# Patient Record
(demographics unavailable — no encounter records)

---

## 2024-11-03 NOTE — HISTORY OF PRESENT ILLNESS
[Pain is well-controlled] : pain is well-controlled [Clean/Dry/Intact] : clean, dry and intact [None] : no vaginal bleeding [Normal] : normal [Pathology reviewed] : pathology reviewed [Fever] : no fever [Chills] : no chills [Nausea] : no nausea [Vomiting] : no vomiting [Erythema] : not erythematous [de-identified] :  Pelvic exam under anesthesia, total laparoscopic hysterectomy, bilateral salpingectomy, and cystoscopy.   [de-identified] : 10/17/2024 [de-identified] :  38 yo female presents for post-op visit, s/p pelvic exam under anesthesia, total laparoscopic hysterectomy, bilateral salpingectomy, and cystoscopy on 10/17/2024. She was discharged on POD 0. She is doing well and has no complaints.   Pathology: 1. Fallopian tube, right, excision  - Fallopian tube with no significant histopathological change  2. Fallopian tube, left, excision - Fallopian tube with no significant histopathological change  3. Uterus and cervix, total hysterectomy - Secretory endometrium - Leiomyomata - Cervix with metaplastic change  Operative Findings:  EUA: Anteverted mobile 12 cm uterus.  No palpable adnexal masses.  LSC: Atraumatic entry sites.  Uterus with 8 cm right fundal intramural fibroid.  Normal-appearing bilateral fallopian tubes and ovaries.  Unremarkable  abdominal survey including liver edge, left hemidiaphragm, bowel and bladder.  Good hemostasis noted.  Cystoscopy: Bilateral ureter or orifices with efflux noted.  No evidence of suture or trauma to the bladder.   [de-identified] :  abd soft, nt, nd  SSE: Cuff intact

## 2024-11-03 NOTE — PLAN
[FreeTextEntry1] :  38 yo female presents for post-op visit, s/p pelvic exam under anesthesia, total laparoscopic hysterectomy, bilateral salpingectomy, and cystoscopy on 10/17/2024. Stable. path benign  -Routine post-op care -Pelvic rest x10 weeks -Rto 4 weeks for second pov

## 2024-11-03 NOTE — ASSESSMENT
[Doing Well] : is doing well [Excellent Pain Control] : has excellent pain control [No Sign of Infection] : is showing no signs of infection [de-identified] :  40 yo female presents for post-op visit, s/p pelvic exam under anesthesia, total laparoscopic hysterectomy, bilateral salpingectomy, and cystoscopy on 10/17/2024. Stable.

## 2024-11-03 NOTE — END OF VISIT
[FreeTextEntry3] :  I, Alaina Bernard , acted as a scribe on behalf of Dr. Fredi Gamble M.D. on 11/01/2024 All medical entries made by the scribe were at my Dr. Fredi Gamble M.D direction and personally dictated by me on 11/01/2024 . I have reviewed the chart and agree that the record accurately reflects my personal performance of the history, physical exam, assessment and plan. I have also personally directed, reviewed, and agreed with the chart.

## 2024-11-03 NOTE — HISTORY OF PRESENT ILLNESS
[Pain is well-controlled] : pain is well-controlled [Clean/Dry/Intact] : clean, dry and intact [None] : no vaginal bleeding [Normal] : normal [Pathology reviewed] : pathology reviewed [Fever] : no fever [Chills] : no chills [Nausea] : no nausea [Vomiting] : no vomiting [Erythema] : not erythematous [de-identified] :  Pelvic exam under anesthesia, total laparoscopic hysterectomy, bilateral salpingectomy, and cystoscopy.   [de-identified] : 10/17/2024 [de-identified] :  40 yo female presents for post-op visit, s/p pelvic exam under anesthesia, total laparoscopic hysterectomy, bilateral salpingectomy, and cystoscopy on 10/17/2024. She was discharged on POD 0. She is doing well and has no complaints.   Pathology: 1. Fallopian tube, right, excision  - Fallopian tube with no significant histopathological change  2. Fallopian tube, left, excision - Fallopian tube with no significant histopathological change  3. Uterus and cervix, total hysterectomy - Secretory endometrium - Leiomyomata - Cervix with metaplastic change  Operative Findings:  EUA: Anteverted mobile 12 cm uterus.  No palpable adnexal masses.  LSC: Atraumatic entry sites.  Uterus with 8 cm right fundal intramural fibroid.  Normal-appearing bilateral fallopian tubes and ovaries.  Unremarkable  abdominal survey including liver edge, left hemidiaphragm, bowel and bladder.  Good hemostasis noted.  Cystoscopy: Bilateral ureter or orifices with efflux noted.  No evidence of suture or trauma to the bladder.   [de-identified] :  abd soft, nt, nd  SSE: Cuff intact

## 2024-11-03 NOTE — ASSESSMENT
[Doing Well] : is doing well [Excellent Pain Control] : has excellent pain control [No Sign of Infection] : is showing no signs of infection [de-identified] :  38 yo female presents for post-op visit, s/p pelvic exam under anesthesia, total laparoscopic hysterectomy, bilateral salpingectomy, and cystoscopy on 10/17/2024. Stable.

## 2024-11-29 NOTE — HISTORY OF PRESENT ILLNESS
[0/10] : no pain reported [Clean/Dry/Intact] : clean, dry and intact [Normal] : the vagina was normal [Doing Well] : is doing well [Excellent Pain Control] : has excellent pain control [No Sign of Infection] : is showing no signs of infection [None] : None [Fever] : no fever [Chills] : no chills [Nausea] : no nausea [Vomiting] : no vomiting [Erythema] : not erythematous [de-identified] :  40 yo presents for post-op visit, s/p total laparoscopic hysterectomy, bilateral salpingectomy, and cystoscopy on  10/17/2024. She was discharged on POD 0. She is doing well and has no complaints.    Pathology done on 10/17/2024 revealed: Surgical Pathology Report - Auth (Verified)  Specimen(s) Submitted 1  Right fallopian tube 2  Left Fallopian tube 3  Uterus and cervix  Final Diagnosis  1. Fallopian tube, right, excision  - Fallopian tube with no significant histopathological change  2. Fallopian tube, left, excision - Fallopian tube with no significant histopathological change  3. Uterus and cervix, total hysterectomy - Secretory endometrium - Leiomyomata - Cervix with metaplastic change   Operative findings done on 10/17/2024 revealed:   EUA: Anteverted mobile 12 cm uterus.  No palpable adnexal masses.  LSC: Atraumatic entry sites.  Uterus with 8 cm right fundal intramural fibroid.  Normal-appearing bilateral fallopian tubes and ovaries.  Unremarkable  abdominal survey including liver edge, left hemidiaphragm, bowel and bladder.  Good hemostasis noted.  Cystoscopy: Bilateral ureter or orifices with efflux noted.  No evidence of suture or trauma to the bladder.     Insert the pathlology report here, you are only inputting the findings on the reports. It can be found under "labs" and labeled "pathology". Be sure to check that the date of the pathology matches with the date of the surgery:   Pathology done on (date) revealed:   Operative findings done on 10/17/2024 revealed:   EUA: Anteverted mobile 12 cm uterus.  No palpable adnexal masses.  LSC: Atraumatic entry sites.  Uterus with 8 cm right fundal intramural fibroid.  Normal-appearing bilateral fallopian tubes and ovaries.  Unremarkable  abdominal survey including liver edge, left hemidiaphragm, bowel and bladder.  Good hemostasis noted.  Cystoscopy: Bilateral ureter or orifices with efflux noted.  No evidence of suture or trauma to the bladder.  PLAN - skin tag near right portsight; an excitsion biopsy was perofmed uner sterile conditions w/ lidocane

## 2024-11-29 NOTE — END OF VISIT
[FreeTextEntry3] : I, Polina Johnson ,acted as a scribe on behalf of Dr. Fredi Gamble M.D. on 11/29/2024.    All medical entries made by the scribe were at my, Dr. Fredi Gamble M.D., direction and personally dictated by me on 11/29/2024. I have reviewed the chart and agree that the record accurately reflects my personal performance of the history, physical exam, assessment and plan. I have also personally directed, reviewed, and agreed with the chart.